# Patient Record
Sex: MALE | URBAN - METROPOLITAN AREA
[De-identification: names, ages, dates, MRNs, and addresses within clinical notes are randomized per-mention and may not be internally consistent; named-entity substitution may affect disease eponyms.]

---

## 2022-10-19 DIAGNOSIS — Z12.11 SCREENING FOR COLON CANCER: Primary | ICD-10-CM

## 2023-05-23 ENCOUNTER — TELEPHONE (OUTPATIENT)
Dept: GASTROENTEROLOGY | Facility: CLINIC | Age: 53
End: 2023-05-23
Payer: COMMERCIAL

## 2023-05-23 NOTE — TELEPHONE ENCOUNTER
Reached out to [facility] per referral. No answer. LVM for pt to return call to our clinic to scheduled - VL

## 2023-05-23 NOTE — TELEPHONE ENCOUNTER
----- Message from Atiya Smith sent at 5/9/2023  3:26 PM CDT -----  Regarding: FW: 1st referral 10.13.22, 2nd referral 04.17.23    ----- Message -----  From: Lisa Santiago  Sent: 4/19/2023   1:56 PM CDT  To: [facility] Procedure Scheduling  Subject: 1st referral 10.13.22, 2nd referral 04.17.23     Direct Schedule - Est Pt RMM - last colon 06.2015    Please call to schedule.     Thanks,   Lisa